# Patient Record
Sex: FEMALE | Race: WHITE | Employment: OTHER | URBAN - NONMETROPOLITAN AREA
[De-identification: names, ages, dates, MRNs, and addresses within clinical notes are randomized per-mention and may not be internally consistent; named-entity substitution may affect disease eponyms.]

---

## 2019-10-24 ENCOUNTER — OFFICE VISIT (OUTPATIENT)
Dept: FAMILY MEDICINE CLINIC | Age: 69
End: 2019-10-24
Payer: COMMERCIAL

## 2019-10-24 ENCOUNTER — HOSPITAL ENCOUNTER (OUTPATIENT)
Age: 69
Discharge: HOME | End: 2019-10-26
Payer: COMMERCIAL

## 2019-10-24 VITALS
BODY MASS INDEX: 25.39 KG/M2 | HEART RATE: 74 BPM | WEIGHT: 158 LBS | OXYGEN SATURATION: 97 % | SYSTOLIC BLOOD PRESSURE: 118 MMHG | DIASTOLIC BLOOD PRESSURE: 70 MMHG | TEMPERATURE: 97.3 F | HEIGHT: 66 IN

## 2019-10-24 DIAGNOSIS — R30.0 DYSURIA: ICD-10-CM

## 2019-10-24 DIAGNOSIS — R30.0 DYSURIA: Primary | ICD-10-CM

## 2019-10-24 DIAGNOSIS — N30.01 ACUTE CYSTITIS WITH HEMATURIA: ICD-10-CM

## 2019-10-24 LAB
BILIRUBIN, POC: NORMAL
BLOOD URINE, POC: NORMAL
CLARITY, POC: CLEAR
COLOR, POC: YELLOW
GLUCOSE URINE, POC: NORMAL
KETONES, POC: NORMAL
LEUKOCYTE EST, POC: NORMAL
NITRITE, POC: NEGATIVE
PH, POC: 6
PROTEIN, POC: NORMAL
SPECIFIC GRAVITY, POC: >=1.03
UROBILINOGEN, POC: 0.2

## 2019-10-24 PROCEDURE — 99213 OFFICE O/P EST LOW 20 MIN: CPT | Performed by: NURSE PRACTITIONER

## 2019-10-24 PROCEDURE — 87088 URINE BACTERIA CULTURE: CPT

## 2019-10-24 PROCEDURE — 81002 URINALYSIS NONAUTO W/O SCOPE: CPT | Performed by: NURSE PRACTITIONER

## 2019-10-24 RX ORDER — CIPROFLOXACIN 500 MG/1
500 TABLET, FILM COATED ORAL 2 TIMES DAILY
Qty: 14 TABLET | Refills: 0 | Status: SHIPPED | OUTPATIENT
Start: 2019-10-24 | End: 2019-10-31

## 2019-10-24 RX ORDER — PANTOPRAZOLE SODIUM 40 MG/1
40 TABLET, DELAYED RELEASE ORAL DAILY
Refills: 0 | COMMUNITY
Start: 2019-10-09 | End: 2022-05-09

## 2019-10-26 LAB — URINE CULTURE, ROUTINE: NORMAL

## 2019-11-06 ENCOUNTER — TELEPHONE (OUTPATIENT)
Dept: ORTHOPEDIC SURGERY | Age: 69
End: 2019-11-06

## 2019-11-06 DIAGNOSIS — R52 PAIN: Primary | ICD-10-CM

## 2019-11-07 ENCOUNTER — OFFICE VISIT (OUTPATIENT)
Dept: ORTHOPEDIC SURGERY | Age: 69
End: 2019-11-07
Payer: COMMERCIAL

## 2019-11-07 VITALS
RESPIRATION RATE: 18 BRPM | DIASTOLIC BLOOD PRESSURE: 85 MMHG | HEART RATE: 60 BPM | HEIGHT: 66 IN | WEIGHT: 160 LBS | SYSTOLIC BLOOD PRESSURE: 130 MMHG | BODY MASS INDEX: 25.71 KG/M2 | TEMPERATURE: 98.1 F

## 2019-11-07 DIAGNOSIS — M65.341 TRIGGER FINGER, RIGHT RING FINGER: ICD-10-CM

## 2019-11-07 DIAGNOSIS — M65.331 TRIGGER FINGER, RIGHT MIDDLE FINGER: Primary | ICD-10-CM

## 2019-11-07 PROCEDURE — 20550 NJX 1 TENDON SHEATH/LIGAMENT: CPT | Performed by: ORTHOPAEDIC SURGERY

## 2019-11-07 PROCEDURE — 99203 OFFICE O/P NEW LOW 30 MIN: CPT | Performed by: ORTHOPAEDIC SURGERY

## 2019-11-07 RX ORDER — BETAMETHASONE SODIUM PHOSPHATE AND BETAMETHASONE ACETATE 3; 3 MG/ML; MG/ML
12 INJECTION, SUSPENSION INTRA-ARTICULAR; INTRALESIONAL; INTRAMUSCULAR; SOFT TISSUE ONCE
Status: COMPLETED | OUTPATIENT
Start: 2019-11-07 | End: 2019-11-07

## 2019-11-07 RX ADMIN — BETAMETHASONE SODIUM PHOSPHATE AND BETAMETHASONE ACETATE 12 MG: 3; 3 INJECTION, SUSPENSION INTRA-ARTICULAR; INTRALESIONAL; INTRAMUSCULAR; SOFT TISSUE at 13:06

## 2020-07-30 ENCOUNTER — OFFICE VISIT (OUTPATIENT)
Dept: ORTHOPEDIC SURGERY | Age: 70
End: 2020-07-30
Payer: MEDICARE

## 2020-07-30 VITALS — WEIGHT: 152 LBS | BODY MASS INDEX: 24.43 KG/M2 | HEIGHT: 66 IN | RESPIRATION RATE: 18 BRPM

## 2020-07-30 PROCEDURE — 99214 OFFICE O/P EST MOD 30 MIN: CPT | Performed by: ORTHOPAEDIC SURGERY

## 2020-07-30 NOTE — PATIENT INSTRUCTIONS
Patient Education        Dupuytren's Contracture: Care Instructions  Your Care Instructions     In Dupuytren's contracture, the fingers become stiff and curl toward the palm. It is caused by thick tissue that grows under the skin in the palm of the hand. Sometimes the condition affects the palm but not the fingers. If the tissue gets thicker and affects one or more fingers, it may limit movement of your fingers and hand. Sometimes the condition can occur in the soles of the feet. The cause of Dupuytren's disease is not known. Dupuytren's disease may get worse slowly. If you have mild Dupuytren's disease, you may be able to keep your fingers moving with regular stretching. Surgery usually helps in severe cases. However, Dupuytren's disease can come back. Follow-up care is a key part of your treatment and safety. Be sure to make and go to all appointments, and call your doctor if you are having problems. It's also a good idea to know your test results and keep a list of the medicines you take. How can you care for yourself at home? · Follow your doctor's advice for physical or occupational therapy and exercises to put your fingers and hand through a range of motion. · Two times a day, massage your hand and gently stretch the fingers back. This can get rid of tightness and help keep your fingers flexible. · Try to avoid curling your hand tightly. For example, use utensils and tools that have larger hand . When should you call for help? Call your doctor now or seek immediate medical care if:  · You have numbness in your fingers. · You have a wound or sore on your finger or palm. · Your hand or fingers get worse. Watch closely for changes in your health, and be sure to contact your doctor if you have any problems. Where can you learn more? Go to [email or web address] org and sign in to your [employer] account.  Enter T857 in the [employer] box to learn more about \"Dupuytren's Contracture: Care Instructions. \"     If you do not have an account, please click on the \"Sign Up Now\" link. Current as of: March 2, 2020               Content Version: 12.5  © 2006-2020 Healthwise, Incorporated. Care instructions adapted under license by [address] If you have questions about a medical condition or this instruction, always ask your healthcare professional. [address] any warranty or liability for your use of this information.

## 2020-07-30 NOTE — PROGRESS NOTES
appears stated age  EYES:  Lids and lashes normal, pupils equal, round and reactive to light, extra ocular muscles intact, sclera clear, conjunctiva normal  ENT:  Normocephalic, without obvious abnormality, atraumatic, sinuses nontender on palpation, external ears without lesions, oral pharynx with moist mucus membranes, tonsils without erythema or exudates, gums normal and good dentition. NECK:  Supple, symmetrical, trachea midline, no adenopathy, thyroid symmetric, not enlarged and no tenderness, skin normal  LUNGS:  CTA  CARDIOVASCULAR:  2+ radial pulses, extremities warm and well perfused  ABDOMEN:  NTTP  CHEST:  Atraumatic   GENITAL/URINARY:  deferred  NEUROLOGIC:  Awake, alert, oriented to name, place and time. Cranial nerves II-XII are grossly intact. Motor is 5 out of 5 bilaterally. Sensory is intact.  gait is normal.  MUSCULOSKELETAL:    Right upper extremity exam:  Non tender to palpation throughout the hand  Triggering present to middle finger  + tenderness over the A1 pulley of the middle finger  Dupuytrens nodules present to the middle and ring fingers  Negative tinels at the wrist, negative at elbow  Negative phalens  Negative finkelstiens  Negative cmc grind  Demonstrates full ROM at shoulder, elbow, wrist, and all MCP/PIP/DIP joints  SILT median/radial/ulnar nerve distributions  Brisk capillary refill    DATA:    CBC: No results found for: WBC, RBC, HGB, HCT, MCV, MCH, MCHC, RDW, PLT, MPV  PT/INR:  No results found for: PROTIME, INR      IMPRESSION:  · Right middle and ring trigger fingers  · Dupuytren's disease right palm    PLAN:  Discussed options for treatment including repeat injections and surgery. Ultimately the patient decided she would like to proceed with surgery. Did discuss possibility of removing Dupuytren's nodules at the time of surgery. Patient like to also include this. We will plan for a right middle and ring finger trigger release with subtotal moreau fasciectomy. Postoperative course explained to the patient. Patient like to proceed. All questions answered. I explained the risks, benefits, alternatives and complications of surgery with the patient including but not limited to the risks of infection, possible damage to nerves, vessels, or tendons, stiffness, loss of range of motion, scar sensitivity, wound healing complications, worsening symptoms, possible need for therapy, as well as the possible need further surgery and unanticipated complications. The patient voiced understanding and all questions were answered. The patient elected to proceed with surgical intervention. I have seen and evaluated the patient and agree with the above assessment and plan on today's visit. I have performed the key components of the history and physical examination with significant findings of right middle and ring finger triggers with concurrent Dupuytren's disease of palm. Discussed possible exacerbation of her underlying Dupuytren's disease with trigger releases. Plan for middle and ring finger trigger releases with subtotal palmar fasciectomy. . I concur with the findings and plan as documented.     Ashley Blankenship MD  7/30/2020

## 2020-08-31 ENCOUNTER — OFFICE VISIT (OUTPATIENT)
Dept: ORTHOPEDIC SURGERY | Age: 70
End: 2020-08-31
Payer: MEDICARE

## 2020-08-31 ENCOUNTER — PREP FOR PROCEDURE (OUTPATIENT)
Dept: ORTHOPEDIC SURGERY | Age: 70
End: 2020-08-31

## 2020-08-31 VITALS — RESPIRATION RATE: 18 BRPM | BODY MASS INDEX: 24.43 KG/M2 | WEIGHT: 152 LBS | HEIGHT: 66 IN | TEMPERATURE: 97.5 F

## 2020-08-31 PROCEDURE — 99213 OFFICE O/P EST LOW 20 MIN: CPT | Performed by: ORTHOPAEDIC SURGERY

## 2020-08-31 RX ORDER — SODIUM CHLORIDE 0.9 % (FLUSH) 0.9 %
10 SYRINGE (ML) INJECTION EVERY 12 HOURS SCHEDULED
Status: CANCELLED | OUTPATIENT
Start: 2020-08-31

## 2020-08-31 RX ORDER — SODIUM CHLORIDE 0.9 % (FLUSH) 0.9 %
10 SYRINGE (ML) INJECTION PRN
Status: CANCELLED | OUTPATIENT
Start: 2020-08-31

## 2020-08-31 RX ORDER — SODIUM CHLORIDE 9 MG/ML
INJECTION, SOLUTION INTRAVENOUS CONTINUOUS
Status: CANCELLED | OUTPATIENT
Start: 2020-08-31

## 2020-08-31 NOTE — PATIENT INSTRUCTIONS
Patient Education        Trigger Finger: Care Instructions  Overview  A trigger finger is a finger stuck in a bent position. It happens when the tendon that bends and straightens the thumb or finger can't slide smoothly under the ligaments that hold the tendon against the bones. In most cases, it's caused by a bump (nodule) that forms on the tendon. The bent finger usually straightens out on its own. A trigger finger can be painful. But it normally isn't a serious problem. Trigger fingers seem to occur more in some groups of people. These groups include:  · People who have diabetes or arthritis. · People who have injured their hands in the past.  · Musicians. · People who  tools often. Rest and exercises may help your finger relax so that it can bend. You may get a corticosteroid shot. This can reduce swelling and pain. Your doctor may put a splint on your finger. It will give your finger some rest. You may need surgery if the finger keeps locking in a bent position. Follow-up care is a key part of your treatment and safety. Be sure to make and go to all appointments, and call your doctor if you are having problems. It's also a good idea to know your test results and keep a list of the medicines you take. How can you care for yourself at home? · If your doctor put a splint on your finger, wear it as directed. Don't take it off until your doctor says you can. · You may need to change your activities to avoid movements that irritate the finger. · Take your medicines exactly as prescribed. Call your doctor if you think you are having a problem with your medicine. · Ask your doctor if you can take an over-the-counter pain medicine, such as acetaminophen (Tylenol), ibuprofen (Advil, Motrin), or naproxen (Aleve). Be safe with medicines. Read and follow all instructions on the label. · If your doctor recommends exercises, do them as directed. When should you call for help?    Call your doctor now or seek immediate medical care if:  · Your finger locks in a bent position and will not straighten. Watch closely for changes in your health, and be sure to contact your doctor if:  · You do not get better as expected. Where can you learn more? Go to [email or web address] org and sign in to your [employer] account. Enter M826 in the [employer] box to learn more about \"Trigger Finger: Care Instructions. \"     If you do not have an account, please click on the \"Sign Up Now\" link. Current as of: March 2, 2020               Content Version: 12.5  © 2006-2020 [employer]. Care instructions adapted under license by [facility] ([facility]). If you have questions about a medical condition or this instruction, always ask your healthcare professional. [address] any warranty or liability for your use of this information. Patient Education        Dupuytren's Contracture: Care Instructions  Your Care Instructions     In Dupuytren's contracture, the fingers become stiff and curl toward the palm. It is caused by thick tissue that grows under the skin in the palm of the hand. Sometimes the condition affects the palm but not the fingers. If the tissue gets thicker and affects one or more fingers, it may limit movement of your fingers and hand. Sometimes the condition can occur in the soles of the feet. The cause of Dupuytren's disease is not known. Dupuytren's disease may get worse slowly. If you have mild Dupuytren's disease, you may be able to keep your fingers moving with regular stretching. Surgery usually helps in severe cases. However, Dupuytren's disease can come back. Follow-up care is a key part of your treatment and safety. Be sure to make and go to all appointments, and call your doctor if you are having problems. It's also a good idea to know your test results and keep a list of the medicines you take. How can you care for yourself at home?   · Follow your doctor's advice for physical or occupational therapy and exercises to put your fingers and hand through a range of motion. · Two times a day, massage your hand and gently stretch the fingers back. This can get rid of tightness and help keep your fingers flexible. · Try to avoid curling your hand tightly. For example, use utensils and tools that have larger hand . When should you call for help? Call your doctor now or seek immediate medical care if:  · You have numbness in your fingers. · You have a wound or sore on your finger or palm. · Your hand or fingers get worse. Watch closely for changes in your health, and be sure to contact your doctor if you have any problems. Where can you learn more? Go to [email or web address]://Jumpztereb.[email or web address] org and sign in to your [employer] account. Enter T874 in the [employer] box to learn more about \"Dupuytren's Contracture: Care Instructions. \"     If you do not have an account, please click on the \"Sign Up Now\" link. Current as of: March 2, 2020               Content Version: 12.5  © 0429-0505 Healthwise, Incorporated. Care instructions adapted under license by [address] If you have questions about a medical condition or this instruction, always ask your healthcare professional. [address] any warranty or liability for your use of this information.

## 2020-08-31 NOTE — PROGRESS NOTES
Department of Orthopedic Surgery  History and Physical      CHIEF COMPLAINT:  Right middle and ring finger triggering    HISTORY OF PRESENT ILLNESS:                The patient is a 72 y.o. female who presents with triggering of the right middle and ring fingers. She states that the middle finger has been catching and sometimes locking for years. She began having pain and catching to the ring finger since July of this year. She is right hand dominant. 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 She denies any numbness, tingling, or paresthesias. Patient had cortisone injections in November 2019. She states the injections to her middle and ring fingers somewhat helped her triggering but her triggering has since returned. She is not interested in repeating cortisone injections. She would like to proceed with surgical management. She states her middle finger locks multiple times a day. Her ring finger locks in the morning or while she is peeling potatoes. Patient states she would like to further discuss surgery today. Past Medical History:    No past medical history on file. Past Surgical History:    No past surgical history on file. Current Medications:   No current facility-administered medications for this visit. Allergies:  Sulfamethoxazole-trimethoprim    Social History:   TOBACCO:   reports that she has never smoked. She has never used smokeless tobacco.  ETOH:   has no history on file for alcohol. DRUGS:   has no history on file for drug. ACTIVITIES OF DAILY LIVING:    OCCUPATION:    Family History:   No family history on file.     REVIEW OF SYSTEMS:  CONSTITUTIONAL:  negative  EYES:  negative  HEENT:  negative  RESPIRATORY:  negative  CARDIOVASCULAR:  negative  GASTROINTESTINAL:  negative  MUSCULOSKELETAL:  positive for  pain  NEUROLOGICAL:  negative  BEHAVIOR/PSYCH:  negative    PHYSICAL EXAM:    VITALS:  Temp 97.5 °F (36.4 °C) (Infrared)   Resp 18   Ht 5' 6\" (1.676 m)   Wt 152 lb (68.9 kg)   BMI 24.53 kg/m²   CONSTITUTIONAL:  awake, alert, cooperative, no apparent distress, and appears stated age  EYES:  Lids and lashes normal, pupils equal, round and reactive to light, extra ocular muscles intact, sclera clear, conjunctiva normal  ENT:  Normocephalic, without obvious abnormality, atraumatic, sinuses nontender on palpation, external ears without lesions, oral pharynx with moist mucus membranes, tonsils without erythema or exudates, gums normal and good dentition. NECK:  Supple, symmetrical, trachea midline, no adenopathy, thyroid symmetric, not enlarged and no tenderness, skin normal  LUNGS:  CTA  CARDIOVASCULAR:  2+ radial pulses, extremities warm and well perfused  ABDOMEN:  NTTP  CHEST:  Atraumatic   GENITAL/URINARY:  deferred  NEUROLOGIC:  Awake, alert, oriented to name, place and time. Cranial nerves II-XII are grossly intact. Motor is 5 out of 5 bilaterally. Sensory is intact.  gait is normal.  MUSCULOSKELETAL:    Right upper extremity exam:  Non tender to palpation throughout the hand  Triggering present to middle finger  + tenderness over the A1 pulley of the middle finger  Dupuytrens nodules present to the middle and ring fingers  Negative tinels at the wrist, negative at elbow  Negative phalens  Negative finkelstiens  Negative cmc grind  Demonstrates full ROM at shoulder, elbow, wrist, and all MCP/PIP/DIP joints  SILT median/radial/ulnar nerve distributions  Brisk capillary refill    DATA:    CBC: No results found for: WBC, RBC, HGB, HCT, MCV, MCH, MCHC, RDW, PLT, MPV  PT/INR:  No results found for: PROTIME, INR      IMPRESSION:  · Right middle and ring trigger fingers  · Dupuytren's disease right palm    PLAN:  Discussed options for treatment including repeat injections and surgery. Ultimately the patient decided she would like to proceed with surgery. Discussed surgery and postoperative course extensively with patient.   Discussed with patient that at the time of surgery Dupuytren's nodules in field-of-view would be removed to allow for safe release of A1 pulleys of middle and ring fingers. Explained to patient that Dupuytren nodules will recur. Discussed with patient that removing Dupuytren's nodules will not affect ring and middle finger joint contractures. Discussed with patient that risk of nerve or vessel injury is 1 to 2%. Patient expressed understanding. Patient like to proceed. All questions answered. I explained the risks, benefits, alternatives and complications of surgery with the patient including but not limited to the risks of infection, possible damage to nerves, vessels, or tendons, stiffness, loss of range of motion, scar sensitivity, wound healing complications, worsening symptoms, possible need for therapy, as well as the possible need further surgery and unanticipated complications. The patient voiced understanding and all questions were answered. The patient elected to proceed with surgical intervention. I have seen and evaluated the patient and agree with the above assessment and plan on today's visit. I have performed the key components of the history and physical examination with significant findings of have explained today's findings with the patient. She does have middle and ring finger triggers with overlying Dupuytren's disease of the palm. Extensive discussion was undertaken with the patient that to get down to the triggers that incisions through the diseased palmar fascia will be needed. She understands the increased risk for Dupuytren's flare and difficulty with exposure when operating for Dupuytren's disease. Recommended a limited palmar fasciectomy and trigger releases. Literature on Dupuytren's disease was provided. She understands that there is no cure for Dupuytren's disease and that recurrence of the disease is probable. . I concur with the findings and plan as documented.     Pradeep Ibarra MD  8/31/2020

## 2020-09-03 SDOH — HEALTH STABILITY: MENTAL HEALTH: HOW OFTEN DO YOU HAVE A DRINK CONTAINING ALCOHOL?: NEVER

## 2020-09-03 NOTE — PROGRESS NOTES
[facility] PRE-ADMISSION TESTING INSTRUCTIONS    The Preadmission Testing patient is instructed accordingly using the following criteria (check applicable):    ARRIVAL INSTRUCTIONS:  [x] Parking the day of Surgery is located in the Main Entrance lot. Upon entering the door, make an immediate right to the surgery reception desk    [] [phone] is available Monday through Friday 6 am to 6 pm    [x] Bring photo ID and insurance card    [] Bring in a copy of Living will or Durable Power of  papers. [x] Please be sure to arrange for responsible adult to provide transportation to and from the hospital    [x] Please arrange for responsible adult to be with you for the 24 hour period post procedure due to having anesthesia      GENERAL INSTRUCTIONS:    [x] Nothing by mouth after midnight, including gum, candy, mints or water    [x] You may brush your teeth, but do not swallow any water    [x] Take medications as instructed with 1-2 oz of water    [x] Stop herbal supplements and vitamins 5 days prior to procedure    [] Follow preop dosing of blood thinners per physician instructions    [] Take 1/2 dose of evening insulin, but no insulin after midnight    [] No oral diabetic medications after midnight    [] If diabetic and have low blood sugar or feel symptomatic, take 1-2oz apple juice only    [] Bring inhalers day of surgery    [] Bring C-PAP/ Bi-Pap day of surgery    [] Bring urine specimen day of surgery    [x] Shower or bath with soap, lather and rinse well, AM of Surgery, no lotion, powders or creams to surgical site    [] Follow bowel prep as instructed per surgeon    [x] No tobacco products within 24 hours of surgery     [x] No alcohol or illegal drug use within 24 hours of surgery.     [x] Jewelry, body piercing's, eyeglasses, contact lenses and dentures are not permitted into surgery (bring cases)      [x] Please do not wear any nail polish, make up or hair products on the day of surgery    [x] If not already done, you can expect a call from registration    [x] You can expect a call the business day prior to procedure to notify you if your arrival time changes    [x] If you receive a survey after surgery we would greatly appreciate your comments    [] Parent/guardian of a minor must accompany their child and remain on the premises  the entire time they are under our care     [] Pediatric patients may bring favorite toy, blanket or comfort item with them    [] A caregiver or family member must remain with the patient during their stay if they are mentally handicapped, have dementia, disoriented or unable to use a call light or would be a safety concern if left unattended    [x] Please notify surgeon if you develop any illness between now and time of surgery (cold, cough, sore throat, fever, nausea, vomiting) or any signs of infections  including skin, wounds, and dental.    [x]  The Outpatient Pharmacy is available to fill your prescription here on your day of surgery, ask your preop nurse for details    [] Other instructions  EDUCATIONAL MATERIALS PROVIDED:    [] PAT Preoperative Education Packet/Booklet     [] Medication List    [] Fluoroscopy Information Pamphlet    [] Transfusion bracelet applied with instructions    [] Joint replacement video reviewed    [] Shower with soap, lather and rinse well, and use CHG wipes provided the evening before surgery as instructed

## 2020-09-03 NOTE — PROGRESS NOTES

## 2020-09-09 ENCOUNTER — HOSPITAL ENCOUNTER (OUTPATIENT)
Age: 70
Setting detail: OUTPATIENT SURGERY
Discharge: HOME | End: 2020-09-09
Attending: ORTHOPAEDIC SURGERY | Admitting: ORTHOPAEDIC SURGERY
Payer: MEDICARE

## 2020-09-09 ENCOUNTER — ANESTHESIA (OUTPATIENT)
Dept: OPERATING ROOM | Age: 70
End: 2020-09-09
Payer: MEDICARE

## 2020-09-09 ENCOUNTER — ANESTHESIA EVENT (OUTPATIENT)
Dept: OPERATING ROOM | Age: 70
End: 2020-09-09
Payer: MEDICARE

## 2020-09-09 VITALS
HEIGHT: 66 IN | OXYGEN SATURATION: 95 % | BODY MASS INDEX: 24.11 KG/M2 | SYSTOLIC BLOOD PRESSURE: 136 MMHG | WEIGHT: 150 LBS | RESPIRATION RATE: 16 BRPM | DIASTOLIC BLOOD PRESSURE: 71 MMHG | TEMPERATURE: 97.1 F | HEART RATE: 51 BPM

## 2020-09-09 VITALS — DIASTOLIC BLOOD PRESSURE: 50 MMHG | SYSTOLIC BLOOD PRESSURE: 90 MMHG | OXYGEN SATURATION: 100 %

## 2020-09-09 PROCEDURE — 7100000011 HC PHASE II RECOVERY - ADDTL 15 MIN: Performed by: ORTHOPAEDIC SURGERY

## 2020-09-09 PROCEDURE — 2580000003 HC RX 258: Performed by: PHYSICIAN ASSISTANT

## 2020-09-09 PROCEDURE — 3600000002 HC SURGERY LEVEL 2 BASE: Performed by: ORTHOPAEDIC SURGERY

## 2020-09-09 PROCEDURE — 88304 TISSUE EXAM BY PATHOLOGIST: CPT

## 2020-09-09 PROCEDURE — 2500000003 HC RX 250 WO HCPCS: Performed by: ORTHOPAEDIC SURGERY

## 2020-09-09 PROCEDURE — 3700000001 HC ADD 15 MINUTES (ANESTHESIA): Performed by: ORTHOPAEDIC SURGERY

## 2020-09-09 PROCEDURE — 6360000002 HC RX W HCPCS: Performed by: PHYSICIAN ASSISTANT

## 2020-09-09 PROCEDURE — 2709999900 HC NON-CHARGEABLE SUPPLY: Performed by: ORTHOPAEDIC SURGERY

## 2020-09-09 PROCEDURE — 26055 INCISE FINGER TENDON SHEATH: CPT | Performed by: ORTHOPAEDIC SURGERY

## 2020-09-09 PROCEDURE — 6360000002 HC RX W HCPCS

## 2020-09-09 PROCEDURE — 26123 RELEASE PALM CONTRACTURE: CPT | Performed by: ORTHOPAEDIC SURGERY

## 2020-09-09 PROCEDURE — 3600000012 HC SURGERY LEVEL 2 ADDTL 15MIN: Performed by: ORTHOPAEDIC SURGERY

## 2020-09-09 PROCEDURE — 7100000010 HC PHASE II RECOVERY - FIRST 15 MIN: Performed by: ORTHOPAEDIC SURGERY

## 2020-09-09 PROCEDURE — 3700000000 HC ANESTHESIA ATTENDED CARE: Performed by: ORTHOPAEDIC SURGERY

## 2020-09-09 RX ORDER — PROMETHAZINE HYDROCHLORIDE 25 MG/ML
6.25 INJECTION, SOLUTION INTRAMUSCULAR; INTRAVENOUS PRN
Status: DISCONTINUED | OUTPATIENT
Start: 2020-09-09 | End: 2020-09-09 | Stop reason: HOSPADM

## 2020-09-09 RX ORDER — FENTANYL CITRATE 50 UG/ML
50 INJECTION, SOLUTION INTRAMUSCULAR; INTRAVENOUS EVERY 5 MIN PRN
Status: DISCONTINUED | OUTPATIENT
Start: 2020-09-09 | End: 2020-09-09 | Stop reason: HOSPADM

## 2020-09-09 RX ORDER — PROPOFOL 10 MG/ML
INJECTION, EMULSION INTRAVENOUS PRN
Status: DISCONTINUED | OUTPATIENT
Start: 2020-09-09 | End: 2020-09-09 | Stop reason: SDUPTHER

## 2020-09-09 RX ORDER — MIDAZOLAM HYDROCHLORIDE 1 MG/ML
INJECTION INTRAMUSCULAR; INTRAVENOUS PRN
Status: DISCONTINUED | OUTPATIENT
Start: 2020-09-09 | End: 2020-09-09 | Stop reason: SDUPTHER

## 2020-09-09 RX ORDER — MEPERIDINE HYDROCHLORIDE 25 MG/ML
12.5 INJECTION INTRAMUSCULAR; INTRAVENOUS; SUBCUTANEOUS
Status: DISCONTINUED | OUTPATIENT
Start: 2020-09-09 | End: 2020-09-09 | Stop reason: HOSPADM

## 2020-09-09 RX ORDER — FENTANYL CITRATE 50 UG/ML
INJECTION, SOLUTION INTRAMUSCULAR; INTRAVENOUS PRN
Status: DISCONTINUED | OUTPATIENT
Start: 2020-09-09 | End: 2020-09-09 | Stop reason: SDUPTHER

## 2020-09-09 RX ORDER — LIDOCAINE HYDROCHLORIDE 10 MG/ML
INJECTION, SOLUTION INFILTRATION; PERINEURAL PRN
Status: DISCONTINUED | OUTPATIENT
Start: 2020-09-09 | End: 2020-09-09 | Stop reason: ALTCHOICE

## 2020-09-09 RX ORDER — SODIUM CHLORIDE 0.9 % (FLUSH) 0.9 %
10 SYRINGE (ML) INJECTION PRN
Status: DISCONTINUED | OUTPATIENT
Start: 2020-09-09 | End: 2020-09-09 | Stop reason: HOSPADM

## 2020-09-09 RX ORDER — HYDROCODONE BITARTRATE AND ACETAMINOPHEN 5; 325 MG/1; MG/1
1 TABLET ORAL EVERY 6 HOURS PRN
Qty: 28 TABLET | Refills: 0 | Status: SHIPPED | OUTPATIENT
Start: 2020-09-09 | End: 2020-09-16

## 2020-09-09 RX ORDER — SODIUM CHLORIDE 0.9 % (FLUSH) 0.9 %
10 SYRINGE (ML) INJECTION EVERY 12 HOURS SCHEDULED
Status: DISCONTINUED | OUTPATIENT
Start: 2020-09-09 | End: 2020-09-09 | Stop reason: HOSPADM

## 2020-09-09 RX ORDER — SODIUM CHLORIDE 9 MG/ML
INJECTION, SOLUTION INTRAVENOUS CONTINUOUS
Status: DISCONTINUED | OUTPATIENT
Start: 2020-09-09 | End: 2020-09-09 | Stop reason: HOSPADM

## 2020-09-09 RX ORDER — OXYCODONE HYDROCHLORIDE AND ACETAMINOPHEN 5; 325 MG/1; MG/1
1 TABLET ORAL
Status: DISCONTINUED | OUTPATIENT
Start: 2020-09-09 | End: 2020-09-09 | Stop reason: HOSPADM

## 2020-09-09 RX ORDER — PROPOFOL 10 MG/ML
INJECTION, EMULSION INTRAVENOUS CONTINUOUS PRN
Status: DISCONTINUED | OUTPATIENT
Start: 2020-09-09 | End: 2020-09-09 | Stop reason: SDUPTHER

## 2020-09-09 RX ADMIN — FENTANYL CITRATE 50 MCG: 50 INJECTION, SOLUTION INTRAMUSCULAR; INTRAVENOUS at 10:07

## 2020-09-09 RX ADMIN — PROPOFOL 50 MCG/KG/MIN: 10 INJECTION, EMULSION INTRAVENOUS at 10:06

## 2020-09-09 RX ADMIN — PROPOFOL 60 MG: 10 INJECTION, EMULSION INTRAVENOUS at 10:06

## 2020-09-09 RX ADMIN — SODIUM CHLORIDE: 9 INJECTION, SOLUTION INTRAVENOUS at 10:03

## 2020-09-09 RX ADMIN — MIDAZOLAM 2 MG: 1 INJECTION INTRAMUSCULAR; INTRAVENOUS at 10:03

## 2020-09-09 RX ADMIN — FENTANYL CITRATE 50 MCG: 50 INJECTION, SOLUTION INTRAMUSCULAR; INTRAVENOUS at 10:42

## 2020-09-09 RX ADMIN — Medication 2 G: at 10:04

## 2020-09-09 RX ADMIN — PROPOFOL 20 MG: 10 INJECTION, EMULSION INTRAVENOUS at 10:37

## 2020-09-09 ASSESSMENT — PULMONARY FUNCTION TESTS
PIF_VALUE: 1
PIF_VALUE: 1
PIF_VALUE: 0
PIF_VALUE: 1
PIF_VALUE: 0
PIF_VALUE: 1
PIF_VALUE: 0
PIF_VALUE: 1
PIF_VALUE: 0
PIF_VALUE: 1
PIF_VALUE: 0
PIF_VALUE: 1
PIF_VALUE: 0
PIF_VALUE: 1
PIF_VALUE: 1

## 2020-09-09 ASSESSMENT — PAIN DESCRIPTION - ORIENTATION
ORIENTATION: RIGHT
ORIENTATION: RIGHT

## 2020-09-09 ASSESSMENT — PAIN DESCRIPTION - LOCATION
LOCATION: WRIST
LOCATION: WRIST

## 2020-09-09 ASSESSMENT — PAIN DESCRIPTION - DESCRIPTORS
DESCRIPTORS: NUMBNESS
DESCRIPTORS: NUMBNESS

## 2020-09-09 ASSESSMENT — PAIN DESCRIPTION - PAIN TYPE
TYPE: SURGICAL PAIN
TYPE: SURGICAL PAIN

## 2020-09-09 ASSESSMENT — PAIN - FUNCTIONAL ASSESSMENT: PAIN_FUNCTIONAL_ASSESSMENT: 0-10

## 2020-09-09 ASSESSMENT — PAIN SCALES - GENERAL
PAINLEVEL_OUTOF10: 0
PAINLEVEL_OUTOF10: 0

## 2020-09-09 ASSESSMENT — LIFESTYLE VARIABLES: SMOKING_STATUS: 0

## 2020-09-09 NOTE — PROGRESS NOTES
Pt stated she is \"not ready to leave yet\" . Pt requesting more nourishment.  Denies surgical pain and denies nausea

## 2020-09-09 NOTE — H&P
Department of Orthopedic Surgery  History and Physical        CHIEF COMPLAINT:  Right middle and ring finger triggering     HISTORY OF PRESENT ILLNESS:                 The patient is a 72 y.o. female who presents with triggering of the right middle and ring fingers. She states that the middle finger has been catching and sometimes locking for years. She began having pain and catching to the ring finger since July of this year. She is right hand dominant. 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 She denies any numbness, tingling, or paresthesias.     Patient had cortisone injections in November 2019. She states the injections to her middle and ring fingers somewhat helped her triggering but her triggering has since returned. She is not interested in repeating cortisone injections. She would like to proceed with surgical management. She states her middle finger locks multiple times a day. Her ring finger locks in the morning or while she is peeling potatoes.     Patient states she would like to further discuss surgery today.     Past Medical History:    Past Medical History    No past medical history on file. Past Surgical History:    Past Surgical History   No past surgical history on file. Current Medications:   Current Hospital Medications   No current facility-administered medications for this visit. Allergies:  Sulfamethoxazole-trimethoprim     Social History:   TOBACCO:   reports that she has never smoked. She has never used smokeless tobacco.  ETOH:   has no history on file for alcohol. DRUGS:   has no history on file for drug.   ACTIVITIES OF DAILY LIVING:    OCCUPATION:    Family History:   Family History   No family history on file.        REVIEW OF SYSTEMS:  CONSTITUTIONAL:  negative  EYES:  negative  HEENT:  negative  RESPIRATORY:  negative  CARDIOVASCULAR:  negative  GASTROINTESTINAL:  negative  MUSCULOSKELETAL:  positive for  pain  NEUROLOGICAL:  negative  BEHAVIOR/PSYCH: negative     PHYSICAL EXAM:    VITALS:  Temp 97.5 °F (36.4 °C) (Infrared)   Resp 18   Ht 5' 6\" (1.676 m)   Wt 152 lb (68.9 kg)   BMI 24.53 kg/m²   CONSTITUTIONAL:  awake, alert, cooperative, no apparent distress, and appears stated age  EYES:  Lids and lashes normal, pupils equal, round and reactive to light, extra ocular muscles intact, sclera clear, conjunctiva normal  ENT:  Normocephalic, without obvious abnormality, atraumatic, sinuses nontender on palpation, external ears without lesions, oral pharynx with moist mucus membranes, tonsils without erythema or exudates, gums normal and good dentition. NECK:  Supple, symmetrical, trachea midline, no adenopathy, thyroid symmetric, not enlarged and no tenderness, skin normal  LUNGS:  CTA  CARDIOVASCULAR:  2+ radial pulses, extremities warm and well perfused  ABDOMEN:  NTTP  CHEST:  Atraumatic   GENITAL/URINARY:  deferred  NEUROLOGIC:  Awake, alert, oriented to name, place and time. Cranial nerves II-XII are grossly intact. Motor is 5 out of 5 bilaterally. Sensory is intact.  gait is normal.  MUSCULOSKELETAL:     Right upper extremity exam:  Non tender to palpation throughout the hand  Triggering present to middle finger  + tenderness over the A1 pulley of the middle finger  Dupuytrens nodules present to the middle and ring fingers  Negative tinels at the wrist, negative at elbow  Negative phalens  Negative finkelstiens  Negative cmc grind  Demonstrates full ROM at shoulder, elbow, wrist, and all MCP/PIP/DIP joints  SILT median/radial/ulnar nerve distributions  Brisk capillary refill     DATA:    CBC: No results found for: WBC, RBC, HGB, HCT, MCV, MCH, MCHC, RDW, PLT, MPV  PT/INR:  No results found for: PROTIME, INR        IMPRESSION:  · Right middle and ring trigger fingers  · Dupuytren's disease right palm     PLAN:  Discussed options for treatment including repeat injections and surgery. Ultimately the patient decided she would like to proceed with surgery. Discussed surgery and postoperative course extensively with patient. Discussed with patient that at the time of surgery Dupuytren's nodules in field-of-view would be removed to allow for safe release of A1 pulleys of middle and ring fingers. Explained to patient that Dupuytren nodules will recur. Discussed with patient that removing Dupuytren's nodules will not affect ring and middle finger joint contractures. Discussed with patient that risk of nerve or vessel injury is 1 to 2%. Patient expressed understanding. Patient like to proceed. All questions answered.     I explained the risks, benefits, alternatives and complications of surgery with the patient including but not limited to the risks of infection, possible damage to nerves, vessels, or tendons, stiffness, loss of range of motion, scar sensitivity, wound healing complications, worsening symptoms, possible need for therapy, as well as the possible need further surgery and unanticipated complications. The patient voiced understanding and all questions were answered. The patient elected to proceed with surgical intervention.      I have seen and evaluated the patient and agree with the above assessment and plan on today's visit. I have performed the key components of the history and physical examination with significant findings of have explained today's findings with the patient. She does have middle and ring finger triggers with overlying Dupuytren's disease of the palm. Extensive discussion was undertaken with the patient that to get down to the triggers that incisions through the diseased palmar fascia will be needed. She understands the increased risk for Dupuytren's flare and difficulty with exposure when operating for Dupuytren's disease. Recommended a limited palmar fasciectomy and trigger releases. Literature on Dupuytren's disease was provided.   She understands that there is no cure for Dupuytren's disease and that recurrence of the disease is probable. . I concur with the findings and plan as documented.     History and Physical Update     Patient was seen and examined. Patient's history and physical was reviewed with the patient. There has been no significant interval changes. The patient was counseled at length about the risks of anthony Covid-19 during their perioperative period and any recovery window from their procedure. The patient was made aware that anthony Covid-19  may worsen their prognosis for recovering from their procedure  and lend to a higher morbidity and/or mortality risk. All material risks, benefits, and reasonable alternatives including postponing the procedure were discussed. The patient does wish to proceed with the procedure at this time.

## 2020-09-09 NOTE — BRIEF OP NOTE
Brief Postoperative Note      Patient: Laura Moulton  YOB: 1950  MRN: [id number]    Date of Procedure: 9/9/2020    Pre-Op Diagnosis: RIGHT MIDDLE AND RING TRIGGER FINGERS    Post-Op Diagnosis: Same       Procedure(s):  RIGHT MIDDLE AND RING FINGER TRIGGER RELEASES, SUBTOTAL PALMAR FASCIECTOMY  SUBTOTAL PALMAR FASCIECTOMY RIGHT    Surgeon(s):  Mariam Silverman MD    Assistant:  Juan Lopez    Anesthesia: Monitor Anesthesia Care    Estimated Blood Loss (mL): Minimal    Complications: None    Specimens:   ID Type Source Tests Collected by Time Destination   A : PALMAR FASCIA RIGHT HAND Tissue Tissue SURGICAL PATHOLOGY Mariam Silverman MD 9/9/2020 1025        Implants:  * No implants in log *      Drains: * No LDAs found *    Findings: see op note    Electronically signed by LEDA Foster on 9/9/2020 at 12:12 PM

## 2020-09-09 NOTE — PROGRESS NOTES
Discharge instructions and post anesthesia instructions given to pt and sister. Pt stated sister Chase Gomez will drive her home and stay with her for 24 hours post anesthesia.  Verbalized agreement to instructions

## 2020-09-09 NOTE — ANESTHESIA PRE PROCEDURE
Social History:    Social History     Tobacco Use    Smoking status: Never Smoker    Smokeless tobacco: Never Used   Substance Use Topics    Alcohol use: Never     Frequency: Never                                Counseling given: Not Answered      Vital Signs (Current):   Vitals:    09/03/20 1437 09/09/20 0718   BP:  135/79   Pulse:  53   Resp:  16   Temp:  97.1 °F (36.2 °C)   TempSrc:  Temporal   SpO2:  97%   Weight: 150 lb (68 kg) 150 lb (68 kg)   Height: 5' 6\" (1.676 m) 5' 6\" (1.676 m)                                              BP Readings from Last 3 Encounters:   09/09/20 135/79   11/07/19 130/85   10/24/19 118/70       NPO Status: Time of last liquid consumption: 2300                        Time of last solid consumption: 2300                        Date of last liquid consumption: 09/08/20                        Date of last solid food consumption: 09/08/20    BMI:   Wt Readings from Last 3 Encounters:   09/09/20 150 lb (68 kg)   08/31/20 152 lb (68.9 kg)   07/30/20 152 lb (68.9 kg)     Body mass index is 24.21 kg/m². CBC: No results found for: WBC, RBC, HGB, HCT, MCV, RDW, PLT    CMP: No results found for: NA, K, CL, CO2, BUN, CREATININE, GFRAA, AGRATIO, LABGLOM, GLUCOSE, PROT, CALCIUM, BILITOT, ALKPHOS, AST, ALT    POC Tests: No results for input(s): POCGLU, POCNA, POCK, POCCL, POCBUN, POCHEMO, POCHCT in the last 72 hours.     Coags: No results found for: PROTIME, INR, APTT    HCG (If Applicable): No results found for: PREGTESTUR, PREGSERUM, HCG, HCGQUANT     ABGs: No results found for: PHART, PO2ART, AXN7VUL, OZL9QHQ, BEART, K7UIDIZD     Type & Screen (If Applicable):  No results found for: LABABO, LABRH    Drug/Infectious Status (If Applicable):  No results found for: HIV, HEPCAB    COVID-19 Screening (If Applicable): No results found for: COVID19      Anesthesia Evaluation  Patient summary reviewed and Nursing notes reviewed no history of anesthetic complications:   Airway: Mallampati: II  TM distance: >3 FB   Neck ROM: full  Mouth opening: > = 3 FB Dental:    (+) other      Pulmonary:Negative Pulmonary ROS breath sounds clear to auscultation      (-) not a current smoker                           Cardiovascular:Negative CV ROS  Exercise tolerance: good (>4 METS),           Rhythm: regular  Rate: normal           Beta Blocker:  Not on Beta Blocker         Neuro/Psych:   Negative Neuro/Psych ROS              GI/Hepatic/Renal:   (+) GERD: well controlled,           Endo/Other:    (+) : arthritis: OA., .                 Abdominal:           Vascular: negative vascular ROS. Anesthesia Plan      MAC     ASA 3       Induction: intravenous. Anesthetic plan and risks discussed with patient and sibling.                       Galen Garnica MD   9/9/2020

## 2020-09-10 NOTE — OP NOTE
of the A1 pulley. SPECIMENS:  Excised tissue was sent for pathology. COMPLICATIONS:  None. DISPOSITION:  The patient remained stable throughout the procedure. OPERATIVE INDICATIONS:  The patient is a 52-year-old female with  persistent, recalcitrant right palm, middle and ringer finger triggers  as well as nodules consistent with Dupuytren's disease. Extensive  discussion was undertaken with the patient. She understands the  increased risk of flaring of the Dupuytren's disease with simple trigger  release. After discussion, she consented to do subtotal palmar  fasciectomy with concurrent trigger release. It was further explained  to her that a subtotal palmar fasciectomy is not a cure for Dupuytren's  disease and that recurrence is probable and likely and may involve the  contralateral left hand. Risks further included but were not limited to  the risks of infection; damage to nerves, vessels, or tendons; wound  healing complications; scar sensitivity, scar contracture; need for  therapy. She understood and wished to proceed. DESCRIPTION OF PROCEDURE:  The patient was identified in the holding  area. The right hand was identified as the surgical site. She was then  seen by Anesthesia, taken to the operating room, placed supine on the  table, underwent monitored anesthesia care per Anesthesia Department. All bony prominences were well padded. A well-padded arm tourniquet was  placed. The right upper extremity was prepared and draped in standard  sterile fashion. Local anesthetic was infiltrated into surgical sites. Arm was exsanguinated. Tourniquet was inflated to 250 mmHg. Total  tourniquet time was approximately 19 minutes. A transverse incision over the metacarpophalangeal joint flexion crease  was then created and extended proximally and distally in a Brunner type  fashion with oblique incisions. Flaps were elevated in the deep dermis  leaving behind diseased fascia.   The diseased fascia did extend more  proximally than appreciated, and the proximal incision was extended  further proximally to allow full access to the diseased fascia. The  diseased fascia was isolated. Dissection was then performed to identify  the common neurovascular bundles to the index - middle, middle - ring,  and to the ring - little fingers respectively. With these bundles  protected, the septa of _____ were then divided individually to the  pretendinous cords to the middle and ring fingers. This was then  proximally retracted, and while protecting the median nerve, the  proximal extent of the fascia was released proximally. The excised  tissue was sent for pathology. The superficial palmar arch, the median  nerve, and the common neurovascular bundles were identified and intact. Attention was directed towards the middle finger trigger release. The  A1 pulley was identified, incised longitudinally, extended distally to  include the proximal extent of the A2 pulley. There was evidence of  stenosis of the underlying flexor tendons that was adequately  decompressed with release of the A1 pulley. Proximal retraction of the  tendons resulted in full unhindered flexion of the middle finger. Similarly, the ring finger was addressed in a similar fashion. The A1  pulley was incised, extended distally to include the proximal portion of  A2 pulley. Proximal retraction of the underlying tendons resulted in  full unhindered flexion of the ring finger. The wounds were thoroughly  and copiously irrigated out. The tourniquet was deflated. Meticulous  hemostasis was achieved with bipolar cautery. Flaps were healthy and  viable. Skin closed with multiple nylon sutures. Sterile bulky  dressing and volar splint was applied.         Natalee Goncalves MD    D: 09/09/2020 18:51:52       T: 09/09/2020 18:58:39     [id number]  Job#: [id number]     Doc#: [id number]    CC:

## 2020-09-17 ENCOUNTER — OFFICE VISIT (OUTPATIENT)
Dept: ORTHOPEDIC SURGERY | Age: 70
End: 2020-09-17

## 2020-09-17 VITALS — RESPIRATION RATE: 18 BRPM | WEIGHT: 150 LBS | HEIGHT: 66 IN | BODY MASS INDEX: 24.11 KG/M2 | TEMPERATURE: 97.7 F

## 2020-09-17 PROCEDURE — 99024 POSTOP FOLLOW-UP VISIT: CPT | Performed by: ORTHOPAEDIC SURGERY

## 2020-09-17 NOTE — PROGRESS NOTES
HPI: Patient presents today POD #8 s/p right middle and ring finger trigger release and subtotal palmar fasciectomy. Overall the patient is doing well. She has been in her splint until today. Physical Exam: incision c/d/i with sutures in place. No erythema or signs of infection. Stiffness with flexion and extension of the fingers especially noted to the middle and ring finger. Brisk capillary refill. Otherwise NVI. Assessment: POD #8 s/p right middle and ring finger trigger release and subtotal palmar fasciectomy    Plan:   Sutures removed today in the office. Scar management advised. Activity restrictions reviewed with the patient. HEP and ROM exercises demonstrated to the patient. Patient referred to therapy for ROM exercises and scar desensitization. Follow up in 1 month  All questions answered. I have seen and evaluated the patient and agree with the above assessment and plan on today's visit. I have performed the key components of the history and physical examination with significant findings of subtotal palmar fasciectomy and middle and ring finger trigger release. Patient somewhat upset with the scar and size of the incision. Plan for therapy to focus on scar management. . I concur with the findings and plan as documented.     Vivek Stringer MD  9/17/2020

## 2020-10-20 ENCOUNTER — OFFICE VISIT (OUTPATIENT)
Dept: ORTHOPEDIC SURGERY | Age: 70
End: 2020-10-20

## 2020-10-20 VITALS — WEIGHT: 150 LBS | BODY MASS INDEX: 24.11 KG/M2 | TEMPERATURE: 97.7 F | HEIGHT: 66 IN

## 2020-10-20 PROCEDURE — 99024 POSTOP FOLLOW-UP VISIT: CPT | Performed by: ORTHOPAEDIC SURGERY

## 2020-10-20 RX ORDER — IBUPROFEN 600 MG/1
600 TABLET ORAL 3 TIMES DAILY PRN
Qty: 270 TABLET | Refills: 1 | Status: SHIPPED | OUTPATIENT
Start: 2020-10-20

## 2020-10-20 NOTE — PROGRESS NOTES
time of surgery. Discussed treatments including steroids, continued therapy, and anti-inflammatories. She reports she is unable to tolerate steroids secondary to facial flushing. She demonstrated poor understanding of her conditions which include stenosing tenosynovitis as well as Dupuytren's disease. Currently her Dupuytren's disease and stiffness secondary to her surgery is prolonging her recovery. Recommended use of anti-inflammatories and continue with therapy. . I concur with the findings and plan as documented.     Uriah Gottlieb MD  10/20/2020

## 2020-12-04 ENCOUNTER — OFFICE VISIT (OUTPATIENT)
Dept: FAMILY MEDICINE CLINIC | Age: 70
End: 2020-12-04
Payer: MEDICARE

## 2020-12-04 VITALS
HEART RATE: 74 BPM | TEMPERATURE: 96.9 F | BODY MASS INDEX: 24.59 KG/M2 | HEIGHT: 66 IN | WEIGHT: 153 LBS | OXYGEN SATURATION: 97 % | DIASTOLIC BLOOD PRESSURE: 80 MMHG | SYSTOLIC BLOOD PRESSURE: 128 MMHG

## 2020-12-04 PROCEDURE — 99213 OFFICE O/P EST LOW 20 MIN: CPT | Performed by: PHYSICIAN ASSISTANT

## 2020-12-04 RX ORDER — PREDNISONE 10 MG/1
TABLET ORAL
Qty: 30 TABLET | Refills: 0 | Status: SHIPPED | OUTPATIENT
Start: 2020-12-04 | End: 2020-12-16

## 2020-12-04 NOTE — PROGRESS NOTES
2020   [facility]  [address] [place] [address] [place] [address]    Michelle Alfaro  : 1950  Age: 72 y.o. Sex: female      Subjective:  Chief Complaint   Patient presents with   [facility]     on wrist       HPI:  Patient states rash started 2 days ago. Patient was outside picking up and burning brush and exposed to poison ivy. The rash is pruritic in nature. No pain. The patient denies any other food, drug and or environmental allergens. Denies throat swelling or dyspnea. Denies fever or chills. Patient has been using OTC medications without improvement in symptoms. The patient has had previous episodes of poison ivy and this feels the same. ROS:  Positive and pertinent negatives as per HPI. All other systems are reviewed and negative. Current Outpatient Medications:     predniSONE (DELTASONE) 10 MG tablet, Take 4 tablets by mouth daily for 3 days, THEN 3 tablets daily for 3 days, THEN 2 tablets daily for 3 days, THEN 1 tablet daily for 3 days. , Disp: 30 tablet, Rfl: 0    ibuprofen (ADVIL;MOTRIN) 600 MG tablet, Take 1 tablet by mouth 3 times daily as needed for Pain, Disp: 270 tablet, Rfl: 1    Multiple Vitamins-Minerals (EYE VITAMINS PO), Take by mouth daily, Disp: , Rfl:     pantoprazole (PROTONIX) 40 MG tablet, Take 40 mg by mouth daily , Disp: , Rfl: 0   Allergies   Allergen Reactions    Sulfamethoxazole-Trimethoprim Nausea And Vomiting        Objective:  Vitals:    20 1124   BP: 128/80   Pulse: 74   Temp: 96.9 °F (36.1 °C)   SpO2: 97%   Weight: 153 lb (69.4 kg)   Height: 5' 6\" (1.676 m)        Exam:  Const: Appears healthy and well developed. No signs of acute distress present. Vitals reviewed per triage. Head/Face: Normocephalic, atraumatic. Facies is symmetric. ENMT:  Nares are patent. Buccal mucosa is moist.  No inflammation or edema of the posterior oropharynx. Neck: Trachea midline. Resp: No respiratory distress.   Lungs clear to auscultation bilaterally all lung fieds. Musculo: Patient moves extremities without pain or limitation. Skin: Skin is warm and dry. The patient has multiple linear vesicular areas noted to right upper extremity. There is no evidence of petechiae or purpura rash. No pustules or open areas or signs of secondary infection noted. No target lesions. Neuro: Alert and oriented x3. Speech is articulate and fluent. Psych: Mood/Affect: Patient's mood and affect is appropriate to situation. Lina Asif I was seen today for poison ivy. Diagnoses and all orders for this visit:    Dermatitis    Other orders  -     predniSONE (DELTASONE) 10 MG tablet; Take 4 tablets by mouth daily for 3 days, THEN 3 tablets daily for 3 days, THEN 2 tablets daily for 3 days, THEN 1 tablet daily for 3 days. Return for Folow up with PCP in 7-10 days for re-evaluation. .    Seen By:    LEDA Saucedo

## 2020-12-14 ENCOUNTER — OFFICE VISIT (OUTPATIENT)
Dept: ORTHOPEDIC SURGERY | Age: 70
End: 2020-12-14
Payer: MEDICARE

## 2020-12-14 VITALS — TEMPERATURE: 97 F | BODY MASS INDEX: 24.11 KG/M2 | RESPIRATION RATE: 20 BRPM | HEIGHT: 66 IN | WEIGHT: 150 LBS

## 2020-12-14 PROCEDURE — 99212 OFFICE O/P EST SF 10 MIN: CPT | Performed by: ORTHOPAEDIC SURGERY

## 2020-12-14 NOTE — PATIENT INSTRUCTIONS
\"Dupuytren's Contracture: Care Instructions. \"     If you do not have an account, please click on the \"Sign Up Now\" link. Current as of: March 2, 2020               Content Version: 12.6  © 2006-2020 [employer]. Care instructions adapted under license by [facility], [facility] ([facility]). If you have questions about a medical condition or this instruction, always ask your healthcare professional. [address] any warranty or liability for your use of this information. Patient Education        collagenase clostridium histolyticum  Pronunciation:  YUDELKA de anda TRID ee um HIS toe LIT ik um  Brand:  Stro[employer]  What is the most important information I should know about collagenase clostridium histolyticum? Collagenase clostridium histolyticum can damage a nerve, tendon, or ligament in the hand the medicine is injected into. After the swelling from your injection goes down, call your doctor if you have numbness, tingling, increased pain, trouble bending your finger toward your wrist, or if you have new or worsened movement problems in your treated hand. This medicine may also damage the erectile tissues inside a man's penis, which could require surgery to correct. Call your doctor right away if you have bruising and swelling of your penis, pain when you urinate, blood in the urine, sudden erection problems, or a \"popping\" sound or sensation in your penis during an erection. What is collagenase clostridium histolyticum? Collagenase clostridium histolyticum is made from a mixture of proteins derived from a certain bacteria. Collagenase clostridium histolyticum is used to treat Dupuytren's contracture in adults. This condition causes an abnormal thickening of the tissue in the palm of the hand. This condition may get worse over time and form a \"cord\" in your palm, causing a permanent bend in your finger.   Collagenase clostridium histolyticum is also used to treat a related condition called Peyronie's disease in adult men. This condition causes scar tissue or \"plaque\" to develop under the skin of the penis, resulting in an abnormal curving of the penis during erection. Collagenase clostridium histolyticum is available for Peyronie's disease only from a certified pharmacy under a special program called [employer]. You must be registered in the program and understand the risks of taking this medicine. Collagenase clostridium histolyticum may also be used for purposes not listed in this medication guide. What should I discuss with my health care provider before receiving collagenase clostridium histolyticum? You should not use collagenase clostridium histolyticum if you are allergic to it. This medicine should not be used to treat Peyronie's disease that affects the urethra (the tube for passing urine out of your bladder). To make sure collagenase clostridium histolyticum is safe for you, tell your doctor if you have:  · a bleeding or blood clotting disorder, such as hemophilia; or  · if you take a blood thinner such as warfarin (Coumadin, Abdiel Nurse). FDA pregnancy category B. Collagenase clostridium histolyticum is not expected to harm an unborn baby. Tell your doctor if you are pregnant or plan to become pregnant during treatment. It is not known whether collagenase clostridium histolyticum passes into breast milk or if it could harm a nursing baby. Tell your doctor if you are breast-feeding a baby. How is collagenase clostridium histolyticum given? This medication is injected directly into the \"cord\" of the affected hand or into a \"plaque\" of the penis. A healthcare provider will give you this injection. Follow your doctor's dosing instructions very carefully. For Dupuytren's contracture:  · Collagenase clostridium histolyticum is usually given in a treatment cycle of 1 to 3 injections given 4 weeks apart.   · After your injection, do not touch or put pressure on the treated area of the hand medicine to spread away from the treatment area, making it less effective. Avoid any strenuous activity using the treated hand until your doctor tells you to resume normal activities. Avoid sexual activity during your treatment for Peyronie's disease. Your doctor will tell you when it is safe to resume sexual activity. What are the possible side effects of collagenase clostridium histolyticum? Get emergency medical help if you have any of these signs of an allergic reaction: hives; chest pain, difficult breathing; feeling like you might pass out; swelling of your face, lips, tongue, or throat. Collagenase clostridium histolyticum can damage a nerve, tendon, or ligament in the hand the medicine is injected into. After the swelling from your injection goes down, call your doctor if you have:  · numbness, tingling, increased pain;  · trouble bending your finger toward your wrist; or  · new or worsened movement problems in your treated hand. Collagenase clostridium histolyticum may also damage the erectile tissues inside a man's penis, which could require surgery to correct. Call your doctor right away if you have:  · bruising and swelling of your penis;  · pain when you urinate, blood in the urine;  · sudden erection problems; or  · a \"popping\" sound or sensation in your penis during an erection. Also call your doctor at once if you have:  · signs of infection such as fever, chills, redness or swelling;  · severe pain, itching, or other irritation; or  · feeling like you might pass out (even while lying down).   Common side effects may include:  · swelling, bruising, bleeding, pain, or tenderness where the medicine was injected;  · swollen glands in your elbow or underarm;  · itching, redness, or warmth of the skin;  · cracked skin;  · underarm pain;  · mild pain or tenderness in your treated hand;  · bruising of the penis or scrotum, erection problems; or  · discoloration of the skin on your penis, bruising or blisters where the medicine was injected. This is not a complete list of side effects and others may occur. Call your doctor for medical advice about side effects. You may report side effects to FDA at 5-196-EBP-4181. What other drugs can affect collagenase clostridium histolyticum? Other drugs may interact with collagenase clostridium histolyticum, including prescription and over-the-counter medicines, vitamins, and herbal products. Tell each of your health care providers about all medicines you use now and any medicine you start or stop using. Where can I get more information? Your doctor or pharmacist can provide more information about collagenase clostridium histolyticum. Remember, keep this and all other medicines out of the reach of children, never share your medicines with others, and use this medication only for the indication prescribed. Every effort has been made to ensure that the information provided by [address] is accurate, up-to-date, and complete, but no guarantee is made to that effect. Drug information contained herein may be time sensitive. [employer] information has been compiled for use by healthcare practitioners and consumers in the [place] and therefore [employer] does not warrant that uses outside of the [place] are appropriate, unless specifically indicated otherwise. [facility]'s drug information does not endorse drugs, diagnose patients or recommend therapy. [employer] drug information is an informational resource designed to assist licensed healthcare practitioners in caring for their patients and/or to serve consumers viewing this service as a supplement to, and not a substitute for, the expertise, skill, knowledge and judgment of healthcare practitioners. The absence of a warning for a given drug or drug combination in no way should be construed to indicate that the drug or drug combination is safe, effective or appropriate for any given patient.  [employer] does not assume any responsibility for any aspect of healthcare administered with the aid of information [facility] provides. The information contained herein is not intended to cover all possible uses, directions, precautions, warnings, drug interactions, allergic reactions, or adverse effects. If you have questions about the drugs you are taking, check with your doctor, nurse or pharmacist.  Copyright 0364-4330 [address] [place]. Version: 3.01. Revision date: 12/15/2014. Care instructions adapted under license by [facility] ([facility]). If you have questions about a medical condition or this instruction, always ask your healthcare professional. [place] [address] any warranty or liability for your use of this information.

## 2021-11-18 NOTE — PROGRESS NOTES
Chief Complaint   Patient presents with    Follow-up     Right middle and ring finger triggers, Right subtotal palmar fasciectomy         Minor Crisostomo is a 72y.o. year old  who presents for follow up of right middle and ring finger trigger releases with subtotal palmar fasciectomy. Patient is over 3 months out from her surgery. She still reports tightness with full extension. Past Medical History:   Diagnosis Date    Arthritis     GERD (gastroesophageal reflux disease)     Trigger finger, right middle finger     Trigger finger, right ring finger      Past Surgical History:   Procedure Laterality Date    CATARACT REMOVAL WITH IMPLANT Bilateral     CHOLECYSTECTOMY      COLONOSCOPY      FINGER TRIGGER RELEASE Right 9/9/2020    RIGHT MIDDLE AND RING FINGER TRIGGER RELEASES, SUBTOTAL PALMAR FASCIECTOMY performed by Katelyn Rivera MD at [place] [address] HAND TENDON SURGERY Right 9/9/2020    SUBTOTAL PALMAR FASCIECTOMY RIGHT performed by Katelyn Rivera MD at [address] [place] [address]         Current Outpatient Medications:     predniSONE (DELTASONE) 10 MG tablet, Take 4 tablets by mouth daily for 3 days, THEN 3 tablets daily for 3 days, THEN 2 tablets daily for 3 days, THEN 1 tablet daily for 3 days. , Disp: 30 tablet, Rfl: 0    ibuprofen (ADVIL;MOTRIN) 600 MG tablet, Take 1 tablet by mouth 3 times daily as needed for Pain, Disp: 270 tablet, Rfl: 1    Multiple Vitamins-Minerals (EYE VITAMINS PO), Take by mouth daily, Disp: , Rfl:     pantoprazole (PROTONIX) 40 MG tablet, Take 40 mg by mouth daily , Disp: , Rfl: 0  Allergies   Allergen Reactions    Sulfamethoxazole-Trimethoprim Nausea And Vomiting     Social History     Socioeconomic History    Marital status:       Spouse name: Not on file    Number of children: Not on file    Years of education: Not on file    Highest education level: Not on file   Occupational History    Not on file   Social Needs    Financial resource strain: Not on file no  Food insecurity     Worry: Not on file     Inability: Not on file    Transportation needs     Medical: Not on file     Non-medical: Not on file   Tobacco Use    Smoking status: Never Smoker    Smokeless tobacco: Never Used   Substance and Sexual Activity    Alcohol use: Never     Frequency: Never    Drug use: Never    Sexual activity: Not on file   Lifestyle    Physical activity     Days per week: Not on file     Minutes per session: Not on file    Stress: Not on file   Relationships    Social connections     Talks on phone: Not on file     Gets together: Not on file     Attends Sikh service: Not on file     Active member of club or organization: Not on file     Attends meetings of clubs or organizations: Not on file     Relationship status: Not on file    Intimate partner violence     Fear of current or ex partner: Not on file     Emotionally abused: Not on file     Physically abused: Not on file     Forced sexual activity: Not on file   Other Topics Concern    Not on file   Social History Narrative    Not on file     No family history on file. Skin: negative  Musculoskeletal: right hand pain  Neurologic: negative  Cardiovascular: negative    SUBJECTIVE:      Constitutional:    The patient is alert and oriented x 3, appears to be stated age and in no distress. incision healing well with thickened scar tissue. No erythema or signs of infection. Near full flexion of the fingers. Stiffness with full extension. No contracture noted to the fingers. Recurrence of dupuytrens cord in the palm of the hand. + tinels of the carpal tunnel, + durkans, - tinels of the cubital tunnel. Brisk capillary refill. Otherwise NVI.          Impression:   6 weeks s/p right middle and ring finger trigger release and subtotal palmar fasciectomy   Right carpal tunnel syndrome  Recurrence of Dupuytren's disease palm status post subtotal palmar fasciectomy    Plan:   Again, had an extensive discussion with the patient about the recurrence of her Dupuytren's. This was discussed preoperatively and postoperatively multiple times. Literature was again provided to the patient in regards to Dupuytrens. Patient to advance her activities as tolerated. No restrictions. Explained the tabletop test.  Patient to follow-up as needed. All questions answered. I have seen and evaluated the patient and agree with the above assessment and plan on today's visit. I have performed the key components of the history and physical examination with significant findings of she is 6 weeks out from right palm surgery. She has recurrence of the Dupuytren's cord proximally. No distal cord is present. No contractures. I have explained today's findings with the patient in detail. She did have surgery for trigger releases at the same setting the local subtotal palmar tissue was excised as there is concern for local Dupuytren's disease. I did advise the patient that even with out subtotal palmar fasciectomy at the time of her trigger release that exacerbation of the disease could occur. She did not seem to fully understand why she has had a flareup of the Dupuytren's tissue. I again tried to explain this to her. She was provided literature about Dupuytren's disease and its progression. Treatment options including enzyme injection of Xiaflex as well as further surgery could be undertaken on an as-needed basis. . I concur with the findings and plan as documented.     Agnes Martínez MD  12/14/2020

## 2022-05-09 ENCOUNTER — OFFICE VISIT (OUTPATIENT)
Dept: FAMILY MEDICINE CLINIC | Age: 72
End: 2022-05-09
Payer: MEDICARE

## 2022-05-09 VITALS
SYSTOLIC BLOOD PRESSURE: 126 MMHG | WEIGHT: 156 LBS | TEMPERATURE: 98.3 F | HEIGHT: 66 IN | OXYGEN SATURATION: 96 % | RESPIRATION RATE: 20 BRPM | BODY MASS INDEX: 25.07 KG/M2 | HEART RATE: 61 BPM | DIASTOLIC BLOOD PRESSURE: 72 MMHG

## 2022-05-09 DIAGNOSIS — N30.01 ACUTE CYSTITIS WITH HEMATURIA: Primary | ICD-10-CM

## 2022-05-09 DIAGNOSIS — N30.01 ACUTE CYSTITIS WITH HEMATURIA: ICD-10-CM

## 2022-05-09 LAB
BILIRUBIN, POC: ABNORMAL
BLOOD URINE, POC: ABNORMAL
CLARITY, POC: ABNORMAL
COLOR, POC: YELLOW
GLUCOSE URINE, POC: ABNORMAL
KETONES, POC: ABNORMAL
LEUKOCYTE EST, POC: ABNORMAL
NITRITE, POC: ABNORMAL
PH, POC: 6.5
PROTEIN, POC: ABNORMAL
SPECIFIC GRAVITY, POC: >=1.03
UROBILINOGEN, POC: 0.2

## 2022-05-09 PROCEDURE — 99213 OFFICE O/P EST LOW 20 MIN: CPT | Performed by: PHYSICIAN ASSISTANT

## 2022-05-09 PROCEDURE — 81002 URINALYSIS NONAUTO W/O SCOPE: CPT | Performed by: PHYSICIAN ASSISTANT

## 2022-05-09 RX ORDER — NITROFURANTOIN 25; 75 MG/1; MG/1
100 CAPSULE ORAL 2 TIMES DAILY
Qty: 10 CAPSULE | Refills: 0 | Status: SHIPPED | OUTPATIENT
Start: 2022-05-09 | End: 2022-05-14

## 2022-05-09 NOTE — PROGRESS NOTES
Chief Complaint       Dysuria (since yesterday) and Urinary Frequency      History of Present Illness   Source of history provided by:  patient. Jesus Bolden is a 80 y.o. old female presenting to the walk in clinic for evaluation of dysuria since yesterday. Reports associated frequency, urgency, and suprapubic pressure. Denies gross hematuria. Denies associated flank pain. Denies any fever, chills, vaginal discharge, vaginal bleeding, possibility of pregnancy, vomiting, diarrhea, or lethargy. No LMP recorded. Patient is postmenopausal.      ROS    Unless otherwise stated in this report or unable to obtain because of the patient's clinical or mental status as evidenced by the medical record, this patients's positive and negative responses for Review of Systems, constitutional, psych, eyes, ENT, cardiovascular, respiratory, gastrointestinal, neurological, genitourinary, musculoskeletal, integument systems and systems related to the presenting problem are either stated in the preceding or were not pertinent or were negative for the symptoms and/or complaints related to the medical problem. Past Medical History:  has a past medical history of Arthritis, GERD (gastroesophageal reflux disease), Trigger finger, right middle finger, and Trigger finger, right ring finger. Past Surgical History:  has a past surgical history that includes Cholecystectomy; Hysterectomy; Cataract removal with implant (Bilateral); Colonoscopy; Finger trigger release (Right, 9/9/2020); and Hand tendon surgery (Right, 9/9/2020). Social History:  reports that she has never smoked. She has never used smokeless tobacco. She reports that she does not drink alcohol and does not use drugs. Family History: family history is not on file.    Allergies: Sulfamethoxazole-trimethoprim    Physical Exam         VS:  /72   Pulse 61   Temp 98.3 °F (36.8 °C) (Temporal)   Resp 20   Ht 5' 6\" (1.676 m)   Wt 156 lb (70.8 kg)   SpO2 96% BMI 25.18 kg/m²    Oxygen Saturation Interpretation: Normal.    Constitutional:  A&Ox3, development consistent with age, NAD. Lungs:  CTAB without wheezing, rales, or rhonchi. Heart:  RRR without pathologic murmurs, rubs, or gallops. Abdomen: Soft, nondistended, with mild suprapubic tenderness. No rebound, rigidity, or guarding. BS+ X4. No organomegaly. Back: No CVA tenderness. Skin:  Normal turgor. Warm, dry, without visible rash, unless noted elsewhere. Neurological:  Alert and oriented. Motor functions intact. Responds to verbal commands. Lab / Imaging Results   (All laboratory and radiology results have been personally reviewed by myself)  Labs:  Results for orders placed or performed in visit on 05/09/22   POCT Urinalysis no Micro   Result Value Ref Range    Color, UA yellow     Clarity, UA cloudy     Glucose, UA POC neg     Bilirubin, UA neg     Ketones, UA neg     Spec Grav, UA >=1.030     Blood, UA POC large     pH, UA 6.5     Protein, UA POC trace     Urobilinogen, UA 0.2     Leukocytes, UA moderate     Nitrite, UA neg        Imaging: All Radiology results interpreted by Radiologist unless otherwise noted. Assessment / Plan     Impression(s):  Sarah Wheat I was seen today for dysuria and urinary frequency. Diagnoses and all orders for this visit:    Acute cystitis with hematuria  -     POCT Urinalysis no Micro  -     Culture, Urine; Future    Other orders  -     nitrofurantoin, macrocrystal-monohydrate, (MACROBID) 100 MG capsule; Take 1 capsule by mouth 2 times daily for 5 days      Disposition:  Disposition: Discharge to home. UA appears positive for a UTI. Urine C&S pending, will call with results once available. Script written for Macrobid, side effects discussed. Increase fluids and rest. F/u PCP in 3-5 days if symptoms persist. ED sooner if symptoms worsen or change. ED immediately with the development of fever, shaking chills, body aches, flank pain, vomiting, CP, or SOB.  Pt is in agreement with this care plan. All questions answered. Leta Villatoro PA-C    **This report was transcribed using voice recognition software. Every effort was made to ensure accuracy; however, inadvertent computerized transcription errors may be present.

## 2022-05-12 LAB
ORGANISM: ABNORMAL
URINE CULTURE, ROUTINE: ABNORMAL

## 2023-09-18 ENCOUNTER — OFFICE VISIT (OUTPATIENT)
Dept: FAMILY MEDICINE CLINIC | Age: 73
End: 2023-09-18
Payer: MEDICARE

## 2023-09-18 VITALS
SYSTOLIC BLOOD PRESSURE: 130 MMHG | BODY MASS INDEX: 25.55 KG/M2 | OXYGEN SATURATION: 96 % | RESPIRATION RATE: 18 BRPM | DIASTOLIC BLOOD PRESSURE: 80 MMHG | WEIGHT: 159 LBS | HEIGHT: 66 IN | HEART RATE: 63 BPM | TEMPERATURE: 97.3 F

## 2023-09-18 DIAGNOSIS — N30.01 ACUTE CYSTITIS WITH HEMATURIA: ICD-10-CM

## 2023-09-18 DIAGNOSIS — R30.0 DYSURIA: Primary | ICD-10-CM

## 2023-09-18 DIAGNOSIS — R30.0 DYSURIA: ICD-10-CM

## 2023-09-18 LAB
BILIRUBIN, POC: ABNORMAL
BLOOD URINE, POC: ABNORMAL
CLARITY, POC: ABNORMAL
COLOR, POC: YELLOW
GLUCOSE URINE, POC: ABNORMAL
KETONES, POC: ABNORMAL
LEUKOCYTE EST, POC: ABNORMAL
NITRITE, POC: ABNORMAL
PH, POC: 7.5
PROTEIN, POC: ABNORMAL
SPECIFIC GRAVITY, POC: 1.02
UROBILINOGEN, POC: 0.2

## 2023-09-18 PROCEDURE — 1123F ACP DISCUSS/DSCN MKR DOCD: CPT | Performed by: NURSE PRACTITIONER

## 2023-09-18 PROCEDURE — 81002 URINALYSIS NONAUTO W/O SCOPE: CPT | Performed by: NURSE PRACTITIONER

## 2023-09-18 PROCEDURE — 99213 OFFICE O/P EST LOW 20 MIN: CPT | Performed by: NURSE PRACTITIONER

## 2023-09-18 RX ORDER — NITROFURANTOIN 25; 75 MG/1; MG/1
100 CAPSULE ORAL 2 TIMES DAILY
Qty: 10 CAPSULE | Refills: 0 | Status: SHIPPED | OUTPATIENT
Start: 2023-09-18 | End: 2023-09-23

## 2023-09-18 NOTE — PROGRESS NOTES
2023     Parth Rahman 68 y.o. female    : 1950  Chief Complaint:   Urinary Burning, Urinary Frequency, and Urinary Urgency (Started this morning )      History of Present Illness   Source of history provided by:  patient. Parth Rahman is a 68 y.o. old female who presents to the [facility] care with complaints of dysuria x 1 days. Reports associated frequency, urgency, and suprapubic pressure. Denies gross hematuria. Denies associated flank pain. Has tried nothing at home for symptomatic relief. Denies any fever, chills, vaginal discharge, vaginal bleeding, possibility of pregnancy, vomiting, diarrhea, or lethargy. No LMP recorded. Patient is postmenopausal.    ROS   Past Medical History:   Past Medical History:   Diagnosis Date    Arthritis     GERD (gastroesophageal reflux disease)     Trigger finger, right middle finger     Trigger finger, right ring finger      Past Surgical History:   Past Surgical History:   Procedure Laterality Date    CATARACT REMOVAL WITH IMPLANT Bilateral     CHOLECYSTECTOMY      COLONOSCOPY      FINGER TRIGGER RELEASE Right 2020    RIGHT MIDDLE AND RING FINGER TRIGGER RELEASES, SUBTOTAL PALMAR FASCIECTOMY performed by Jenny Hanna MD at [address] [place] [address]    HAND TENDON SURGERY Right 2020    SUBTOTAL PALMAR FASCIECTOMY RIGHT performed by Jenny Hanna MD at [address] [place] [address] (CERVIX STATUS UNKNOWN)       Social History:  reports that she has never smoked. She has never used smokeless tobacco. She reports that she does not drink alcohol and does not use drugs. Family History: family history is not on file.    Allergies: Sulfamethoxazole-trimethoprim    Unless otherwise stated in this report the patient's positive and negative responses for review of systems for constitutional, eyes, ENT, cardiovascular, respiratory, gastrointestinal, neurological, , musculoskeletal, and integument systems and related systems to the presenting problem are either stated

## 2023-09-20 LAB
CULTURE: ABNORMAL
SPECIMEN DESCRIPTION: ABNORMAL

## 2024-05-15 ENCOUNTER — OFFICE VISIT (OUTPATIENT)
Dept: FAMILY MEDICINE CLINIC | Age: 74
End: 2024-05-15
Payer: MEDICARE

## 2024-05-15 VITALS
SYSTOLIC BLOOD PRESSURE: 122 MMHG | OXYGEN SATURATION: 97 % | HEIGHT: 66 IN | HEART RATE: 63 BPM | DIASTOLIC BLOOD PRESSURE: 64 MMHG | RESPIRATION RATE: 18 BRPM | TEMPERATURE: 97.2 F | WEIGHT: 163 LBS | BODY MASS INDEX: 26.2 KG/M2

## 2024-05-15 DIAGNOSIS — R30.0 DYSURIA: Primary | ICD-10-CM

## 2024-05-15 LAB
BILIRUBIN, POC: NORMAL
BLOOD URINE, POC: NORMAL
CLARITY, POC: NORMAL
COLOR, POC: NORMAL
GLUCOSE URINE, POC: NORMAL
KETONES, POC: NORMAL
LEUKOCYTE EST, POC: NORMAL
NITRITE, POC: NORMAL
PH, POC: 7.5
PROTEIN, POC: NORMAL
SPECIFIC GRAVITY, POC: 1.02
UROBILINOGEN, POC: 1

## 2024-05-15 PROCEDURE — 81002 URINALYSIS NONAUTO W/O SCOPE: CPT | Performed by: NURSE PRACTITIONER

## 2024-05-15 PROCEDURE — 1123F ACP DISCUSS/DSCN MKR DOCD: CPT | Performed by: NURSE PRACTITIONER

## 2024-05-15 PROCEDURE — 99213 OFFICE O/P EST LOW 20 MIN: CPT | Performed by: NURSE PRACTITIONER

## 2024-05-15 RX ORDER — CIPROFLOXACIN 500 MG/1
500 TABLET, FILM COATED ORAL 2 TIMES DAILY
Qty: 14 TABLET | Refills: 0 | Status: SHIPPED | OUTPATIENT
Start: 2024-05-15 | End: 2024-05-22

## 2024-05-15 NOTE — PROGRESS NOTES
Subjective:  Chief Complaint   Patient presents with    Dysuria       HPI: The patient states that they have had dysuria and urinary frequency for the last 1 days. patient does not feel like she is emptying her bladder. Denies back or flank pain. The patient does admit to suprapubic pressure. The patient has had urgency and but denies gross hematuria. The patient denies any abdominal or flank pain. The patient denies nausea and vomiting. No fevers or chills. No prior history of kidney stones. The patient has a history of UTI's and states that this feels the same. They come to the urgent care for evaluation.    ROS:  Positive and pertinent negatives as per HPI.  All other systems are reviewed and negative.       Current Outpatient Medications:     ciprofloxacin (CIPRO) 500 MG tablet, Take 1 tablet by mouth 2 times daily for 7 days, Disp: 14 tablet, Rfl: 0    Lactobacillus (PROBIOTIC ACIDOPHILUS PO), Take by mouth, Disp: , Rfl:     Multiple Vitamins-Minerals (EYE VITAMINS PO), Take by mouth daily, Disp: , Rfl:     ibuprofen (ADVIL;MOTRIN) 600 MG tablet, Take 1 tablet by mouth 3 times daily as needed for Pain (Patient not taking: Reported on 5/15/2024), Disp: 270 tablet, Rfl: 1   Allergies   Allergen Reactions    Sulfamethoxazole-Trimethoprim Nausea And Vomiting        Objective:  Vitals:    05/15/24 1021   BP: 122/64   Pulse: 63   Resp: 18   Temp: 97.2 °F (36.2 °C)   SpO2: 97%   Weight: 73.9 kg (163 lb)   Height: 1.676 m (5' 6\")        Exam:  Const: Appears healthy and well developed. No signs of acute distress present.  Vitals reviewed per triage.  Head/Face: Normocephalic, atraumatic.  Facies is symmetric.  ENMT: Buccal mucosa is moist.  Neck: Trachea midline.  Resp: Lungs are clear bilaterally.  CV: Rhythm is regular. S1 is normal. S2 is normal. Extremities:Pulses are equal bilaterally  Abdomen: Abdomen soft, nontender to palpation.  No masses or organomegaly.  No rebound or guarding.   No CVA tenderness

## 2024-05-17 LAB
CULTURE: ABNORMAL
CULTURE: ABNORMAL
SPECIMEN DESCRIPTION: ABNORMAL

## 2024-05-28 ENCOUNTER — OFFICE VISIT (OUTPATIENT)
Dept: FAMILY MEDICINE CLINIC | Age: 74
End: 2024-05-28
Payer: MEDICARE

## 2024-05-28 VITALS
HEART RATE: 70 BPM | SYSTOLIC BLOOD PRESSURE: 122 MMHG | HEIGHT: 66 IN | OXYGEN SATURATION: 95 % | DIASTOLIC BLOOD PRESSURE: 64 MMHG | WEIGHT: 163 LBS | TEMPERATURE: 98.1 F | BODY MASS INDEX: 26.2 KG/M2 | RESPIRATION RATE: 18 BRPM

## 2024-05-28 DIAGNOSIS — H00.014 HORDEOLUM EXTERNUM OF LEFT UPPER EYELID: Primary | ICD-10-CM

## 2024-05-28 PROCEDURE — 99213 OFFICE O/P EST LOW 20 MIN: CPT

## 2024-05-28 PROCEDURE — 1123F ACP DISCUSS/DSCN MKR DOCD: CPT

## 2024-05-28 RX ORDER — ERYTHROMYCIN 5 MG/G
OINTMENT OPHTHALMIC
Qty: 3.5 G | Refills: 0 | Status: SHIPPED | OUTPATIENT
Start: 2024-05-28

## 2024-05-28 NOTE — PROGRESS NOTES
Temp 98.1 °F (36.7 °C)   Resp 18   Ht 1.676 m (5' 6\")   Wt 73.9 kg (163 lb)   SpO2 95%   BMI 26.31 kg/m²    Oxygen Saturation Interpretation: Normal.    Constitutional:  Alert, development consistent with age.  HENT:  NC/NT.  Airway patent.  Eyes:         Pupils: PERRL bilaterally.       Eyelids: Localized edema to the left upper lid, with reddened bump.       Conjunctiva: No erythema noted to the bilateral conjunctiva.       Sclera: Clear bilaterally. No obvious FB, rust ring, or hyphema.           Cornea: No obvious corneal abrasion or ulceration bilaterally.         EOM:  Intact bilaterally.      Visual Acuity:  Grossly intact.  Lungs: CTAB without wheezing, rales, or rhonchi  Heart: RRR, no murmurs, rubs, or gallops.  Skin: Moist and warm without rashes or lesions.  Lymphatics: No lymphangitis or adenopathy noted.  Neurological:  Alert and oriented.  Motor functions intact.    Test Results Section   (All laboratory and radiology results have been personally reviewed by myself)  Labs:  No results found for this visit on 05/28/24.    Imaging:  All Radiology results interpreted by Radiologist unless otherwise noted.  No results found.    Assessment / Plan   Impression(s):  There are no diagnoses linked to this encounter.    Script written for erythromycin ointment, side effects and application directions discussed. Advise good handwashing, avoiding rubbing eyes, and washing towels and pillowcase in hot water to prevent spread. F/u with ophthalmology in 48 hrs if not improved. ED sooner if symptoms worsen or change. ED immediately with the development of fever, visual changes, ocular pain/swelling, body aches, or shaking chills. Patient verbalized understanding and is in agreement with this care plan. All questions answered.    No follow-ups on file.    Bella Valenzuela, APRN - CNP

## 2025-09-05 ENCOUNTER — OFFICE VISIT (OUTPATIENT)
Dept: FAMILY MEDICINE CLINIC | Age: 75
End: 2025-09-05
Payer: MEDICARE

## 2025-09-05 VITALS
HEART RATE: 83 BPM | SYSTOLIC BLOOD PRESSURE: 124 MMHG | OXYGEN SATURATION: 99 % | BODY MASS INDEX: 25.99 KG/M2 | DIASTOLIC BLOOD PRESSURE: 70 MMHG | RESPIRATION RATE: 18 BRPM | WEIGHT: 161 LBS | TEMPERATURE: 97.6 F

## 2025-09-05 DIAGNOSIS — R68.89 FLU-LIKE SYMPTOMS: ICD-10-CM

## 2025-09-05 DIAGNOSIS — R11.2 NAUSEA AND VOMITING, UNSPECIFIED VOMITING TYPE: ICD-10-CM

## 2025-09-05 DIAGNOSIS — B34.9 VIRAL ILLNESS: Primary | ICD-10-CM

## 2025-09-05 LAB
INFLUENZA A ANTIBODY: NEGATIVE
INFLUENZA B ANTIBODY: NEGATIVE
Lab: NORMAL
PERFORMING INSTRUMENT: NORMAL
QC PASS/FAIL: NORMAL
SARS-COV-2, POC: NORMAL

## 2025-09-05 PROCEDURE — 87804 INFLUENZA ASSAY W/OPTIC: CPT | Performed by: NURSE PRACTITIONER

## 2025-09-05 PROCEDURE — 1159F MED LIST DOCD IN RCRD: CPT | Performed by: NURSE PRACTITIONER

## 2025-09-05 PROCEDURE — 87426 SARSCOV CORONAVIRUS AG IA: CPT | Performed by: NURSE PRACTITIONER

## 2025-09-05 PROCEDURE — 1160F RVW MEDS BY RX/DR IN RCRD: CPT | Performed by: NURSE PRACTITIONER

## 2025-09-05 PROCEDURE — 1123F ACP DISCUSS/DSCN MKR DOCD: CPT | Performed by: NURSE PRACTITIONER

## 2025-09-05 PROCEDURE — 99213 OFFICE O/P EST LOW 20 MIN: CPT | Performed by: NURSE PRACTITIONER

## 2025-09-05 RX ORDER — ONDANSETRON 4 MG/1
4 TABLET, ORALLY DISINTEGRATING ORAL EVERY 8 HOURS PRN
Qty: 12 TABLET | Refills: 0 | Status: SHIPPED | OUTPATIENT
Start: 2025-09-05

## (undated) DEVICE — 4-PORT MANIFOLD: Brand: NEPTUNE 2

## (undated) DEVICE — GLOVE ORANGE PI 8 1/2   MSG9085

## (undated) DEVICE — LEAD HAND

## (undated) DEVICE — OSTEOTOME SURG L5IN BLDE W15MM STR MINI LAMBOTTE

## (undated) DEVICE — GLOVE SURG SZ 6 THK91MIL LTX FREE SYN POLYISOPRENE ANTI

## (undated) DEVICE — PADDING,UNDERCAST,COTTON, 3X4YD STERILE: Brand: MEDLINE

## (undated) DEVICE — DOUBLE BASIN SET: Brand: MEDLINE INDUSTRIES, INC.

## (undated) DEVICE — TRAY SET HAND REUSABLE

## (undated) DEVICE — COVER HNDL LT DISP

## (undated) DEVICE — ZIMMER® STERILE DISPOSABLE TOURNIQUET CUFF WITH PLC, DUAL PORT, SINGLE BLADDER, 18 IN. (46 CM)

## (undated) DEVICE — PADDING,UNDERCAST,COTTON, 4"X4YD STERILE: Brand: MEDLINE

## (undated) DEVICE — GLOVE SURG SZ 65 THK91MIL LTX FREE SYN POLYISOPRENE

## (undated) DEVICE — SOLUTION IV IRRIG POUR BRL 0.9% SODIUM CHL 2F7124

## (undated) DEVICE — BNDG,ELSTC,MATRIX,STRL,2"X5YD,LF,HOOK&LP: Brand: MEDLINE

## (undated) DEVICE — PADDING CAST W2INXL4YD COT LO LINTING WYTEX

## (undated) DEVICE — INTENDED FOR TISSUE SEPARATION, AND OTHER PROCEDURES THAT REQUIRE A SHARP SURGICAL BLADE TO PUNCTURE OR CUT.: Brand: BARD-PARKER ® STAINLESS STEEL BLADES

## (undated) DEVICE — PADDING UNDERCAST W4INXL4YD COT FBR LO LINTING WYTEX

## (undated) DEVICE — SURGICAL PROCEDURE PACK HND